# Patient Record
Sex: FEMALE | Race: WHITE
[De-identification: names, ages, dates, MRNs, and addresses within clinical notes are randomized per-mention and may not be internally consistent; named-entity substitution may affect disease eponyms.]

---

## 2020-01-29 ENCOUNTER — HOSPITAL ENCOUNTER (INPATIENT)
Dept: HOSPITAL 92 - L&D | Age: 30
LOS: 2 days | Discharge: HOME | End: 2020-01-31
Attending: OBSTETRICS & GYNECOLOGY | Admitting: OBSTETRICS & GYNECOLOGY
Payer: COMMERCIAL

## 2020-01-29 VITALS — BODY MASS INDEX: 35.3 KG/M2

## 2020-01-29 DIAGNOSIS — A60.00: ICD-10-CM

## 2020-01-29 DIAGNOSIS — Z79.899: ICD-10-CM

## 2020-01-29 DIAGNOSIS — Z3A.41: ICD-10-CM

## 2020-01-29 LAB
HBSAG INDEX: 0.16 S/CO (ref 0–0.99)
HGB BLD-MCNC: 13.1 G/DL (ref 12–16)
MCH RBC QN AUTO: 30.3 PG (ref 27–31)
MCV RBC AUTO: 92.3 FL (ref 78–98)
PLATELET # BLD AUTO: 184 THOU/UL (ref 130–400)
RBC # BLD AUTO: 4.32 MILL/UL (ref 4.2–5.4)
SYPHILIS ANTIBODY INDEX: 0.09 S/CO
WBC # BLD AUTO: 14.5 THOU/UL (ref 4.8–10.8)

## 2020-01-29 PROCEDURE — 86850 RBC ANTIBODY SCREEN: CPT

## 2020-01-29 PROCEDURE — 36415 COLL VENOUS BLD VENIPUNCTURE: CPT

## 2020-01-29 PROCEDURE — 86780 TREPONEMA PALLIDUM: CPT

## 2020-01-29 PROCEDURE — S0020 INJECTION, BUPIVICAINE HYDRO: HCPCS

## 2020-01-29 PROCEDURE — 51702 INSERT TEMP BLADDER CATH: CPT

## 2020-01-29 PROCEDURE — 10907ZC DRAINAGE OF AMNIOTIC FLUID, THERAPEUTIC FROM PRODUCTS OF CONCEPTION, VIA NATURAL OR ARTIFICIAL OPENING: ICD-10-PCS | Performed by: OBSTETRICS & GYNECOLOGY

## 2020-01-29 PROCEDURE — 85027 COMPLETE CBC AUTOMATED: CPT

## 2020-01-29 PROCEDURE — 10H07YZ INSERTION OF OTHER DEVICE INTO PRODUCTS OF CONCEPTION, VIA NATURAL OR ARTIFICIAL OPENING: ICD-10-PCS | Performed by: OBSTETRICS & GYNECOLOGY

## 2020-01-29 PROCEDURE — 86901 BLOOD TYPING SEROLOGIC RH(D): CPT

## 2020-01-29 PROCEDURE — 87340 HEPATITIS B SURFACE AG IA: CPT

## 2020-01-29 PROCEDURE — 86900 BLOOD TYPING SEROLOGIC ABO: CPT

## 2020-01-29 RX ADMIN — Medication SCH ML: at 17:48

## 2020-01-29 RX ADMIN — Medication SCH ML: at 11:59

## 2020-01-29 RX ADMIN — Medication SCH ML: at 06:57

## 2020-01-29 NOTE — PDOC.LDPN
Labor & Delivery Progress Note





- Subjective


Subjective: comfortable ( occasioanl discomfort is she lays on the left side.)





- Objective


Vital signs reviewed and normal: yes


General: resting, breathing through contractions


Uterine fundus: non tender


Dilation: 8


Effacement: 100%


Station: -1


FHT: category 1


[place] contractions every: 2


AROM: clear fluid


IUPC placed: yes





- Assessment


(1) Primigravida


Code(s): Z34.00 - ENCNTR FOR SUPRVSN OF NORMAL FIRST PREGNANCY, UNSP TRIMESTER 

  Current Visit: Yes   Status: Acute   





(2) 41 weeks gestation of pregnancy


Code(s): Z3A.41 - 41 WEEKS GESTATION OF PREGNANCY   Current Visit: Yes   Status

: Acute   





(3) Genital herpes


Code(s): A60.00 - HERPESVIRAL INFECTION OF UROGENITAL SYSTEM, UNSPECIFIED   

Current Visit: Yes   Status: Acute   


Qualifiers: 


   Herpes simplex infection site: perianal skin   Qualified Code(s): A60.1 - 

Herpesviral infection of perianal skin and rectum   


Plan: pitocin for augmentation

## 2020-01-29 NOTE — PDOC.LDHP
Labor and Delivery H&P


Chief complaint: contractions


HPI: 





she was scheduled for an IOL today.  At 0245 she woke up with contractions that 

got very close together and fast. 


Current gestational age (weeks): 41 (3 days)


Dating criteria: last menstrual period


Grav: 1


Para: 0


Abnormal US findings: No


Current medications: pre- vitamins, other (Valtrex 1 gm PO QD)


Allergies/Adverse Reactions: 


 Allergies











Allergy/AdvReac Type Severity Reaction Status Date / Time


 


No Known Allergies Allergy   Verified 20 04:44











Social history: none





- Physical Exam


General: resting


Lungs: nonlabored breathing


Abdomen: gravid


Extremeties: trace edema


FHT: category 1





- Vaginal Exam


Effacement: 100%


Station: -1





- OB Labs


Antibody Screen: negative


HIV: negative


RPR: negative


HEPSAg: negative


1 hour GCT: negative


GBS: negative


Urine drug screen: negative


Rubella: immune





- Assessment


L&D Assessment: term patient in labor





- Plan


Plan: admit to L&D, informed consent obtained, anesthesia consult for pain 

management

## 2020-01-29 NOTE — OP
DATE OF PROCEDURE:  2020



PREOPERATIVE DIAGNOSES:  

1. Term intrauterine pregnancy in labor.

2. Failure to progress.



POSTOPERATIVE DIAGNOSES:  

1. Term intrauterine pregnancy in labor.

2. Failure to progress.

3. Persistent occiput posterior.



SURGEON: Chandler Sinclair MD



ASSISTANT:  Birdie Menjivar CNM



PROCEDURE PERFORMED:  Primary low segment transverse  section via

Pfannenstiel incision. 



FINDINGS:  

1. Viable male infant, weight 3897 grams, Apgars 9 and 9, found in the occiput 
posterior

position. 

2. Normal uterus, tubes, and ovaries bilaterally.



COMPLICATIONS:  Atony treated with Methergine IM 0.2 mg, as well as Pitocin 
drip.



ESTIMATED BLOOD LOSS:  800 mL, QBL pending.



COMPLICATIONS:  None.



PROPHYLAXIS:  Ancef 1 g prior to incision.



BRIEF PATIENT DESCRIPTION:  Ms. Ortiz is a 29-year-old primigravida, who is a

patient of Birdie Menjivar and she has labor through the day with slow progress.
  She

is not completely dilated and due to poor progress at this point, she is 
requesting

a primary  section.  The risks of the procedure including anesthesia,

bleeding, infection, as well as damage to adjacent organs requiring repair, 
removal

or transfusion were discussed with her in detail and she wishes to proceed. 



TECHNIQUE IN DETAIL:  After good epidural anesthesia was achieved, the patient 
was

prepped and draped in usual sterile fashion in the supine position with leftward

tilt.  A transverse incision was made 2 fingerbreadths above the symphysis 
pubis and

the incision was carried down into the abdominal cavity.  A bladder flap was 
created

in the peritoneum and a transverse incision was made over the lower uterine 
segment.

 Baby was delivered from the occiput posterior presentation.  The nasopharynx 
was

bulb suctioned and the remainder of the baby was then delivered.  The cord was

clamped and cut, and the baby was taken to the warmer with pediatric staff

attending.  Cord blood was then obtained and the placenta was manually removed.
  The

inside of the uterus was curetted with a dry lap to remove all remaining 
placental

fragments.  The uterus was boggy and at this point, Pitocin drip was started 
and 0.2

Methergine was ordered to be given intramuscular.  She did have reasonable 
response

to these measures.  The uterus was closed using a running locking suture of

Monocryl.  Interrupted sutures of chromic were placed for complete hemostasis.  
The

uterus was replaced in the abdominal cavity and the pelvic gutters were cleared 
of

all clots and debris.  The uterine incision was again reviewed and was noted to 
be

hemostatic. 



The peritoneum was closed in a running suture using plain gut suture.  The 
rectus

muscles were made dry using Bovie coagulation technique.  The fascia was closed

using a running suture of PDS.  The subcutaneous tissue was thoroughly 
irrigated and

made dry using Bovie coagulation technique.  The subcutaneous tissue was closed

using interrupted plain gut suture.  The skin was closed with metal staples.

Sponge, lap, and needle counts were correct.  The patient tolerated the 
procedure

well and was taken to the recovery room in good condition. 







Job ID:  [id number]



[facility]

## 2020-01-30 LAB
HGB BLD-MCNC: 9.5 G/DL (ref 12–16)
MCH RBC QN AUTO: 32.1 PG (ref 27–31)
MCV RBC AUTO: 93.8 FL (ref 78–98)
PLATELET # BLD AUTO: 177 THOU/UL (ref 130–400)
RBC # BLD AUTO: 2.95 MILL/UL (ref 4.2–5.4)
WBC # BLD AUTO: 22 THOU/UL (ref 4.8–10.8)

## 2020-01-30 RX ADMIN — DOCUSATE CALCIUM SCH MG: 240 CAPSULE, LIQUID FILLED ORAL at 22:24

## 2020-01-30 RX ADMIN — HYDROCODONE BITARTRATE AND ACETAMINOPHEN PRN TAB: 5; 325 TABLET ORAL at 19:40

## 2020-01-30 RX ADMIN — HYDROCODONE BITARTRATE AND ACETAMINOPHEN PRN TAB: 5; 325 TABLET ORAL at 14:48

## 2020-01-30 RX ADMIN — DOCUSATE CALCIUM SCH: 240 CAPSULE, LIQUID FILLED ORAL at 12:30

## 2020-01-30 RX ADMIN — SIMETHICONE PRN MG: 80 TABLET, CHEWABLE ORAL at 22:24

## 2020-01-30 NOTE — PDOC.PP
Post Partum Progress Note


Post Partum Day #: POD1


Subjective: 


Resting, no complaints.


PO intake tolerated: yes


Flatus: no


Ambulation: no


 Vital Signs (12 hours)











  Temp Pulse Resp BP Pulse Ox


 


 01/30/20 04:37  98.4 F  112 H  18  120/62 


 


 01/30/20 02:30  98.4 F  102 H  18  112/57 L  96


 


 01/30/20 01:05  98.6 F  95  18  110/57 L  96








 Weight











Weight                         93.44 kg

















- Physical Examination


General: NAD


Respiratory: non-labored breathing


Abdominal: no distention, appropriately TTP


Extremities: negative homans (B)


Neurological: no gross focal deficits


Psychiatric: normal affect


Result Diagrams: 


 01/30/20 05:46





Additional Labs: 


 Post Partum Labs











Blood Type  O POSITIVE   01/29/20  05:31    


 


Hep Bs Antigen  Non-Reactive S/CO (NonReactive)   01/29/20  04:55    














- Assessment/Plan


Stable postop.


Clear liquids and advance.


FRANTZ castellanos today.


Routine postop care.

## 2020-01-31 VITALS — TEMPERATURE: 98.1 F | DIASTOLIC BLOOD PRESSURE: 59 MMHG | SYSTOLIC BLOOD PRESSURE: 104 MMHG

## 2020-01-31 RX ADMIN — HYDROCODONE BITARTRATE AND ACETAMINOPHEN PRN TAB: 5; 325 TABLET ORAL at 00:30

## 2020-01-31 RX ADMIN — HYDROCODONE BITARTRATE AND ACETAMINOPHEN PRN TAB: 5; 325 TABLET ORAL at 10:00

## 2020-01-31 RX ADMIN — DOCUSATE CALCIUM SCH MG: 240 CAPSULE, LIQUID FILLED ORAL at 09:08

## 2020-01-31 RX ADMIN — SIMETHICONE PRN MG: 80 TABLET, CHEWABLE ORAL at 05:29

## 2020-01-31 RX ADMIN — HYDROCODONE BITARTRATE AND ACETAMINOPHEN PRN TAB: 5; 325 TABLET ORAL at 13:54

## 2020-01-31 RX ADMIN — SIMETHICONE PRN MG: 80 TABLET, CHEWABLE ORAL at 10:02

## 2020-01-31 RX ADMIN — SIMETHICONE PRN MG: 80 TABLET, CHEWABLE ORAL at 13:47

## 2020-01-31 RX ADMIN — HYDROCODONE BITARTRATE AND ACETAMINOPHEN PRN TAB: 5; 325 TABLET ORAL at 05:28

## 2020-01-31 NOTE — DIS
DATE OF ADMISSION:  2020



DATE OF DISCHARGE:  2020



ADMITTING DIAGNOSES:  

1. Intrauterine pregnancy at 41 weeks and 3 days.

2. Induction of labor.



DISCHARGE DIAGNOSES:  

1. Intrauterine pregnancy at 41 weeks and 3 days.

2. Induction of labor. 

3. Failure to progress, persistent occiput posterior, primary lower transverse

 section. 



PROCEDURE:  Primary lower transverse  section.



CONSULTATIONS:  Ob-Gyn.



HOSPITAL COURSE:  The patient is a 29-year-old female who is under the care of Ms. Birdie Menjivar for induction of labor at 41 weeks plus gestation ending in failure

to progress requiring a primary lower transverse  section for delivery,

which was performed by Dr. Michael Sinclair.  For complete details, please refer to

the operative note.  Her postoperative course has been uncomplicated.  She reports

that she is tolerating p.o., voiding on her own, having decreased lochia and good

pain control.  She is now postop day #2 and has an interest in discharging home if

possible. 



PHYSICAL EXAMINATION:

VITAL SIGNS:  Blood pressure is 98/50, temperature 98.2, pulse of 104, respiratory

rate of 18. 

GENERAL:  She appears to be in no acute distress.  She is alert, oriented,

cooperative, and pleasant to interact. 

HEENT:  Head is normocephalic, atraumatic. 

ABDOMEN:  Fundus is firm.  Incision is clean, dry, and intact with staples.



LABORATORY DATA:  Postoperative hemoglobin is 9.5, hematocrit 27.6, and platelets of

177,000. 



DISCHARGE INSTRUCTIONS:  The patient is being discharged to home with tramadol and

ibuprofen for pain control.  She has instructions to seek medical attention if she

experiences pain, increasing bleeding, discharge, redness or drainage from the

incision site.  She has been counseled to follow up with Ms. Packer next Monday or

Tuesday for staple removal. 







Job ID:  [id number]